# Patient Record
Sex: MALE | Race: WHITE | NOT HISPANIC OR LATINO | Employment: FULL TIME | URBAN - METROPOLITAN AREA
[De-identification: names, ages, dates, MRNs, and addresses within clinical notes are randomized per-mention and may not be internally consistent; named-entity substitution may affect disease eponyms.]

---

## 2023-10-24 ENCOUNTER — TELEMEDICINE (OUTPATIENT)
Dept: PRIMARY CARE | Facility: CLINIC | Age: 38
End: 2023-10-24
Payer: COMMERCIAL

## 2023-10-24 DIAGNOSIS — E06.1 THYROIDITIS, DE QUERVAIN: Primary | ICD-10-CM

## 2023-10-24 PROCEDURE — 99213 OFFICE O/P EST LOW 20 MIN: CPT | Performed by: STUDENT IN AN ORGANIZED HEALTH CARE EDUCATION/TRAINING PROGRAM

## 2023-10-24 RX ORDER — TRAZODONE HYDROCHLORIDE 100 MG/1
1 TABLET ORAL NIGHTLY
COMMUNITY
Start: 2023-07-16 | End: 2024-04-16 | Stop reason: SDUPTHER

## 2023-10-24 NOTE — PROGRESS NOTES
Subjective   Patient ID: Christian Louie is a 37 y.o. male who presents for Joint Swelling.    HPI   1 week history of swelling in his right first digit.  At the base of the metacarpal.  Originally was a lot more proximal however feels like it is migrating more distally.  Denies any direct trauma to the area.  Has been working on his father-in-law's roof.  Has been ripping off a ladder suhail.  A lot of repetitive motion and gripping.  Pain with certain movements.  Denies any chest pain or shortness of breath.  Afebrile course.  Feels like it swollen however physically does not look very swollen.    Review of Systems   Constitutional:  Negative for chills, fatigue and fever.   HENT:  Negative for congestion, ear pain, facial swelling, hearing loss, rhinorrhea and sore throat.    Respiratory:  Negative for cough, shortness of breath and wheezing.    Cardiovascular:  Negative for chest pain and palpitations.   Gastrointestinal:  Negative for abdominal pain, constipation, diarrhea, nausea and vomiting.   Genitourinary:  Negative for dysuria and frequency.   Musculoskeletal:  Negative for arthralgias and joint swelling.   Skin:  Negative for rash.   Neurological:  Negative for dizziness and syncope.       Objective   There were no vitals taken for this visit.    Physical Exam  Constitutional:       Appearance: Normal appearance.   Musculoskeletal:      Comments: Positive Finkelstein sign on the right   Neurological:      Mental Status: He is alert.   Psychiatric:         Mood and Affect: Mood normal.         Assessment/Plan   We will start the patient on oral steroids as well as topical Voltaren.  Advised patient to please be more mindful of repetitive motion regarding gripping and using the first digit.  A hard rigid thumb spica splint recommended for use when not active.

## 2023-10-25 RX ORDER — METHYLPREDNISOLONE 4 MG/1
TABLET ORAL
Qty: 21 TABLET | Refills: 0 | Status: SHIPPED | OUTPATIENT
Start: 2023-10-25 | End: 2023-11-01

## 2023-10-25 RX ORDER — DICLOFENAC SODIUM 10 MG/G
4 GEL TOPICAL 4 TIMES DAILY
Qty: 100 G | Refills: 1 | Status: SHIPPED | OUTPATIENT
Start: 2023-10-25

## 2023-10-25 ASSESSMENT — ENCOUNTER SYMPTOMS
FATIGUE: 0
FREQUENCY: 0
VOMITING: 0
RHINORRHEA: 0
JOINT SWELLING: 0
FEVER: 0
CHILLS: 0
NAUSEA: 0
PALPITATIONS: 0
DIZZINESS: 0
ARTHRALGIAS: 0
SHORTNESS OF BREATH: 0
SORE THROAT: 0
ABDOMINAL PAIN: 0
WHEEZING: 0
COUGH: 0
CONSTIPATION: 0
DIARRHEA: 0
FACIAL SWELLING: 0
DYSURIA: 0

## 2024-04-15 ENCOUNTER — OFFICE VISIT (OUTPATIENT)
Dept: PRIMARY CARE | Facility: CLINIC | Age: 39
End: 2024-04-15
Payer: COMMERCIAL

## 2024-04-15 ENCOUNTER — HOSPITAL ENCOUNTER (OUTPATIENT)
Dept: RADIOLOGY | Facility: CLINIC | Age: 39
Discharge: HOME | End: 2024-04-15
Payer: COMMERCIAL

## 2024-04-15 ENCOUNTER — LAB (OUTPATIENT)
Dept: LAB | Facility: LAB | Age: 39
End: 2024-04-15
Payer: COMMERCIAL

## 2024-04-15 VITALS
BODY MASS INDEX: 18.83 KG/M2 | SYSTOLIC BLOOD PRESSURE: 121 MMHG | HEART RATE: 87 BPM | OXYGEN SATURATION: 99 % | WEIGHT: 139 LBS | DIASTOLIC BLOOD PRESSURE: 80 MMHG | HEIGHT: 72 IN

## 2024-04-15 DIAGNOSIS — R20.2 NUMBNESS AND TINGLING OF RIGHT ARM: ICD-10-CM

## 2024-04-15 DIAGNOSIS — R20.2 NUMBNESS AND TINGLING OF RIGHT ARM: Primary | ICD-10-CM

## 2024-04-15 DIAGNOSIS — E06.1 THYROIDITIS, DE QUERVAIN: ICD-10-CM

## 2024-04-15 DIAGNOSIS — R20.0 NUMBNESS AND TINGLING OF RIGHT ARM: ICD-10-CM

## 2024-04-15 DIAGNOSIS — R20.0 NUMBNESS AND TINGLING OF RIGHT ARM: Primary | ICD-10-CM

## 2024-04-15 DIAGNOSIS — M54.2 NECK PAIN: ICD-10-CM

## 2024-04-15 LAB
ALBUMIN SERPL BCP-MCNC: 4.9 G/DL (ref 3.4–5)
ALP SERPL-CCNC: 50 U/L (ref 33–120)
ALT SERPL W P-5'-P-CCNC: 31 U/L (ref 10–52)
ANION GAP SERPL CALC-SCNC: 18 MMOL/L (ref 10–20)
AST SERPL W P-5'-P-CCNC: 45 U/L (ref 9–39)
BASOPHILS # BLD AUTO: 0.03 X10*3/UL (ref 0–0.1)
BASOPHILS NFR BLD AUTO: 0.4 %
BILIRUB SERPL-MCNC: 1.1 MG/DL (ref 0–1.2)
BUN SERPL-MCNC: 14 MG/DL (ref 6–23)
CALCIUM SERPL-MCNC: 10 MG/DL (ref 8.6–10.3)
CHLORIDE SERPL-SCNC: 96 MMOL/L (ref 98–107)
CHOLEST SERPL-MCNC: 199 MG/DL (ref 0–199)
CHOLESTEROL/HDL RATIO: 1.9
CO2 SERPL-SCNC: 28 MMOL/L (ref 21–32)
CREAT SERPL-MCNC: 0.91 MG/DL (ref 0.5–1.3)
EGFRCR SERPLBLD CKD-EPI 2021: >90 ML/MIN/1.73M*2
EOSINOPHIL # BLD AUTO: 0.01 X10*3/UL (ref 0–0.7)
EOSINOPHIL NFR BLD AUTO: 0.1 %
ERYTHROCYTE [DISTWIDTH] IN BLOOD BY AUTOMATED COUNT: 11.6 % (ref 11.5–14.5)
GLUCOSE SERPL-MCNC: 101 MG/DL (ref 74–99)
HCT VFR BLD AUTO: 42.4 % (ref 41–52)
HDLC SERPL-MCNC: 105.9 MG/DL
HGB BLD-MCNC: 14.7 G/DL (ref 13.5–17.5)
IMM GRANULOCYTES # BLD AUTO: 0.02 X10*3/UL (ref 0–0.7)
IMM GRANULOCYTES NFR BLD AUTO: 0.2 % (ref 0–0.9)
LDLC SERPL CALC-MCNC: 73 MG/DL
LYMPHOCYTES # BLD AUTO: 1.78 X10*3/UL (ref 1.2–4.8)
LYMPHOCYTES NFR BLD AUTO: 21.9 %
MCH RBC QN AUTO: 32.7 PG (ref 26–34)
MCHC RBC AUTO-ENTMCNC: 34.7 G/DL (ref 32–36)
MCV RBC AUTO: 94 FL (ref 80–100)
MONOCYTES # BLD AUTO: 0.62 X10*3/UL (ref 0.1–1)
MONOCYTES NFR BLD AUTO: 7.6 %
NEUTROPHILS # BLD AUTO: 5.68 X10*3/UL (ref 1.2–7.7)
NEUTROPHILS NFR BLD AUTO: 69.8 %
NON HDL CHOLESTEROL: 93 MG/DL (ref 0–149)
NRBC BLD-RTO: 0 /100 WBCS (ref 0–0)
PLATELET # BLD AUTO: 236 X10*3/UL (ref 150–450)
POTASSIUM SERPL-SCNC: 4.5 MMOL/L (ref 3.5–5.3)
PROT SERPL-MCNC: 7.7 G/DL (ref 6.4–8.2)
RBC # BLD AUTO: 4.5 X10*6/UL (ref 4.5–5.9)
SODIUM SERPL-SCNC: 137 MMOL/L (ref 136–145)
TRIGL SERPL-MCNC: 100 MG/DL (ref 0–149)
TSH SERPL-ACNC: 1.9 MIU/L (ref 0.44–3.98)
VLDL: 20 MG/DL (ref 0–40)
WBC # BLD AUTO: 8.1 X10*3/UL (ref 4.4–11.3)

## 2024-04-15 PROCEDURE — 80053 COMPREHEN METABOLIC PANEL: CPT

## 2024-04-15 PROCEDURE — 72040 X-RAY EXAM NECK SPINE 2-3 VW: CPT | Performed by: RADIOLOGY

## 2024-04-15 PROCEDURE — 72040 X-RAY EXAM NECK SPINE 2-3 VW: CPT

## 2024-04-15 PROCEDURE — 84443 ASSAY THYROID STIM HORMONE: CPT

## 2024-04-15 PROCEDURE — 99214 OFFICE O/P EST MOD 30 MIN: CPT | Performed by: INTERNAL MEDICINE

## 2024-04-15 PROCEDURE — 85025 COMPLETE CBC W/AUTO DIFF WBC: CPT

## 2024-04-15 PROCEDURE — 80061 LIPID PANEL: CPT

## 2024-04-15 PROCEDURE — 36415 COLL VENOUS BLD VENIPUNCTURE: CPT

## 2024-04-15 ASSESSMENT — ENCOUNTER SYMPTOMS
LOSS OF SENSATION IN FEET: 0
OCCASIONAL FEELINGS OF UNSTEADINESS: 0
DEPRESSION: 0

## 2024-04-15 NOTE — PROGRESS NOTES
Mr. Louie is seen office today with chief complaint of numbness and tingling in the right forearm starting from the elbow going up to the wrist.  This has happened several times.  He is a  is worried.  He has a history of tendinitis before but did not have any symptoms at this time.  He also complains of some neck pain on movements.  He denies any fever or chills.  Has no chest pain or palpitation.  Has no respiratory symptoms.  Has no weakness of any extremity.  Denies any cold intolerance any symptoms of thyroid dysfunction.  Review of other systems are negative.    On examination:  General examination: Normal  Eyes: There is no pallor or jaundice  Neck: There is no lymphadenopathy or JVD movements of the cervical spine are slightly painful in all directions  Lungs: Clear to auscultation  CVS: Heart sounds are regular there is no gallop murmur  Abdomen: Normal exam  CNS: Power is normal  Examination of the right forearm.  Touch and position sensations seem to be intact in upper extremity power is normal.    Assessment and plan  1.  Paresthesias involving the right forearm: I am going to order a nerve conduction study for further evaluation  2.  Neck pain: X-ray of the cervical spine is ordered for further evaluation  3.  History of thyroiditis: Repeat TSH is ordered  4.  Health maintenance: I will do some routine blood testing including CBC CMP and lipid panel.  His follow-up appointment will depend on the result of the test ordered today.

## 2024-04-16 DIAGNOSIS — F51.01 PRIMARY INSOMNIA: Primary | ICD-10-CM

## 2024-04-16 DIAGNOSIS — R79.89 ABNORMAL LFTS: Primary | ICD-10-CM

## 2024-04-16 RX ORDER — TRAZODONE HYDROCHLORIDE 100 MG/1
100 TABLET ORAL NIGHTLY
Qty: 90 TABLET | Refills: 1 | Status: SHIPPED | OUTPATIENT
Start: 2024-04-16

## 2024-04-16 NOTE — TELEPHONE ENCOUNTER
Patient was just here yesterday, and forgot to tell you that Dr. Dale prescribed his Trazodone 100 mg. He would like that refilled.

## 2024-04-16 NOTE — TELEPHONE ENCOUNTER
Forget my last note. The patient DID see a liver specialist Nava Martines cnp.   She referred him a liver biopsie, which was done on 12/28/2022. It is in his chart under imaging. H wants a new doctor to for it .

## 2024-05-06 ENCOUNTER — HOSPITAL ENCOUNTER (OUTPATIENT)
Dept: NEUROLOGY | Facility: HOSPITAL | Age: 39
Discharge: HOME | End: 2024-05-06
Payer: COMMERCIAL

## 2024-05-06 DIAGNOSIS — M54.2 NECK PAIN: ICD-10-CM

## 2024-05-06 DIAGNOSIS — R20.2 NUMBNESS AND TINGLING OF RIGHT ARM: ICD-10-CM

## 2024-05-06 DIAGNOSIS — R20.0 NUMBNESS AND TINGLING OF RIGHT ARM: ICD-10-CM

## 2024-05-06 PROCEDURE — 95913 NRV CNDJ TEST 13/> STUDIES: CPT

## 2024-05-06 NOTE — PROCEDURES
Electromyography Laboratory Report       Accreditation with Exemplary Status       Name SARIKA PETERSON MRN [id number] Ref Provider JAMIRELIZA MELCHOR Technologist Link   Gender Male Age 38 Years ED[facility] Physician Good [place] MD  Temp ºC 32    1985 Height 6 feet 0 inch Order No [id number] Study Date 2024 9:10 AM      Reason for Referral:   Cervical Radiculopathy,         Motor Nerve Conduction Study   Nerve/Sites Muscle Amplitude Latency Velocity F min     mV ms m/s ms     Right Left Ref. Right Left Ref. Right Left Ref. Right Left Ref.   Median - APB      Wrist APB 9.2 10.4 >=6.0 5.6 5.1 <=3.9    32.5 31.6 <=33.5      AC Fossa APB 9.0 9.2  10.4 10.1  51.1 51.0 >=50.0      Ulnar - ADM      Wrist ADM 7.0 6.1 >=7.0 3.5 3.9 <=3.1    33.1 33.2 <=32.8      B. Elbow ADM 6.9 5.9  7.8 7.8  49.7 49.0 >=50.0         A. Elbow ADM 6.4 5.8  10.4 10.4  38.1 39.3 >=50.0          Sensory Nerve Conduction Study   Nerve/Sites Rec. Site Amplitude Peak Lat Velocity     µV ms m/s     Right Left Ref. Right Left Ref. Right Left Ref.   Median      Wrist Index 14.6 17.3 >=20.0 4.7 4.3 <=3.4 46.6 52.6 >=50.0   Ulnar      Wrist Little 18.0 21.2 >=12.0 4.3 4.2 <=3.1 43.4 49.6 >=50.0   Radial      Forearm Snuff Box 19.2 24.9 >=18.0 3.4 3.4 <=2.7 37.6 47.0 >=50.0   Median, Ulnar - Palmar Mixed      Median  Palm Med Wr 44.6 33.1  2.5 2.4 <=2.2 40.4 43.9 >=49.0      Ulnar Palm Uln Wr 31.6 30.2  2.3 2.3 <=2.2 48.0 53.0 >=49.0                      EMG Summary Table     Spontaneous Voluntary Activity   Muscle Nerve Roots Ins Act Fibs/PSW Fasc Other Amp Dur Phases Recruitment Activation Notes   R. APB - Abd Poll Brev Median C8-T1 NL 0 0 - NL +1 N/+1 SL NL -   R. PT - Pron Teres Median C6-C7 NL 0 0 - NL NL NL NL NL -   R. FDI - First Dors Int Ulnar C8-T1 NL 0 0 - NL +2 N/+1 SL NL -   R. FCU - Flex Carp Uln Ulnar C7-T1 NL 0 0 - NL NL NL NL NL -   R. TB - Triceps Brach Radial C6-C8 NL 0 0 - NL NL NL NL NL -   R. BB - Biceps Brach Musculocutaneous C5-C6  NL 0 0 - NL NL NL NL NL -   R. MD - Deltoid (med) Axillary C5-C6 NL 0 0 - NL NL NL NL NL -   R. Cervical psp (low)  - NL 0 0 - NL NL NL NL NL -   R. Cervical psp (mid)  - NL 0 0 - NL NL NL NL NL -   R. Cervical psp (up)  - NL 0 0 - NL NL NL NL NL -   L. APB - Abd Poll Brev Median C8-T1 NL 0 0 - NL +1 N/+1 SL NL -   L. PT - Pron Teres Median C6-C7 NL 0 0 - NL NL NL NL NL -   L. FDI - First Dors Int Ulnar C8-T1 NL 0 0 - NL +1 NL NL NL -   L. FCU - Flex Carp Uln Ulnar C7-T1 NL 0 0 - NL NL NL NL NL -   L. TB - Triceps Brach Radial C6-C8 NL 0 0 - NL NL NL NL NL -   L. BB - Biceps Brach Musculocutaneous C5-C6 NL 0 0 - NL NL NL NL NL -   L. MD - Deltoid (med) Axillary C5-C6 NL 0 0 - NL NL NL NL NL -   L. Cervical psp (low)  - NL 0 0 - NL NL NL NL NL -   L. Cervical psp (mid)  - NL 0 0 - NL NL NL NL NL -          NL = Normal, Inc = Increased, Dec = Decreased, CRD = Complex Repetitive Discharge; SL = Slightly Decreased; MO = Moderately Decreased; MK = Markedly Decreased; N/+1, +1, +2, +3 = Borderline, Slightly, Moderately, Markedly Increased; N/-1, -1, -2, -3 = Borderline, Slightly, Moderately, Markedly Decreased, N/A = Not Applicable    Summary:     Impression:  This study reveals [employer] evidence of chronic, neuropathic, axonal, sensorimotor processes affecting both median nerves at the wrists, consistent with the clinical diagnosis of bilateral carpal tunnel syndrome, and both ulnar nerves at the elbows (ulnar grooves / cubital tunnels). The R median nerve findings are of mild to moderate degree by electrical criteria, with the others being of mild degree. Note that bilateral CTS can result from diabetes mellitus or hypothyroidism.  The L ulnar nerve at the wrist (Guyon's canal) demonstrates is borderline.   There is no suggestion by this study of more proximal processes e.g. plexopathy or radiculopathy, nor of more widespread processes e.g. peripheral neuropathy or mononeuritis multiplex.

## 2024-05-07 DIAGNOSIS — G56.03 BILATERAL CARPAL TUNNEL SYNDROME: Primary | ICD-10-CM

## 2024-06-06 ENCOUNTER — OFFICE VISIT (OUTPATIENT)
Dept: PRIMARY CARE | Facility: CLINIC | Age: 39
End: 2024-06-06
Payer: COMMERCIAL

## 2024-06-06 VITALS
OXYGEN SATURATION: 98 % | SYSTOLIC BLOOD PRESSURE: 122 MMHG | WEIGHT: 133 LBS | BODY MASS INDEX: 18.01 KG/M2 | DIASTOLIC BLOOD PRESSURE: 78 MMHG | HEIGHT: 72 IN | HEART RATE: 72 BPM

## 2024-06-06 DIAGNOSIS — R73.9 HYPERGLYCEMIA: ICD-10-CM

## 2024-06-06 DIAGNOSIS — G56.03 BILATERAL CARPAL TUNNEL SYNDROME: ICD-10-CM

## 2024-06-06 DIAGNOSIS — R20.0 NUMBNESS AND TINGLING OF RIGHT ARM: Primary | ICD-10-CM

## 2024-06-06 DIAGNOSIS — M43.02 CERVICAL SPONDYLOLYSIS: ICD-10-CM

## 2024-06-06 DIAGNOSIS — R20.2 NUMBNESS AND TINGLING OF RIGHT ARM: Primary | ICD-10-CM

## 2024-06-06 DIAGNOSIS — R79.89 ABNORMAL LFTS: ICD-10-CM

## 2024-06-06 PROCEDURE — 99214 OFFICE O/P EST MOD 30 MIN: CPT | Performed by: INTERNAL MEDICINE

## 2024-06-06 ASSESSMENT — ENCOUNTER SYMPTOMS
DEPRESSION: 0
LOSS OF SENSATION IN FEET: 0
OCCASIONAL FEELINGS OF UNSTEADINESS: 0

## 2024-06-06 NOTE — PROGRESS NOTES
Internal Medicine Outpatient Visit  Chief Complaint   Patient presents with    Follow-up     Follow up from ortho appt.        HPI: Christian Louie is of 38 y.o. who is here for Internal Visit for the following issues:  Patient is seen office today for follow up of Recent nerve conduction study that showed that patient has bilateral carpal tunnel syndrome.  He had an orthopedic opinion however the orthopedic surgeon did not think that his symptoms is related to carpal tunnel syndrome and he suggested a detailed neurology consultation.  Patient is still has some numbness and tingling in the hands but he has more pain now.  He is appetite is not good.  He drinks about 24 cans of beer every week.  He denies any chest pain or palpitation.  Has no shortness of breath or wheezing.  Denies any nausea matting pain abdomen.  He has not noticed any blood in urine or stool.  He has no weakness of any extremity.  Denies any symptom of major depression.  Review of other systems are negative.    Past Surgical History:   Procedure Laterality Date    OTHER SURGICAL HISTORY  09/27/2022    Cyst excision    US GUIDED NEEDLE LIVER BIOPSY  12/28/2022    US GUIDED NEEDLE LIVER BIOPSY 12/28/2022 [facility]       Family History   Problem Relation Name Age of Onset    No Known Problems Mother           Current Outpatient Medications on File Prior to Visit   Medication Sig Dispense Refill    diclofenac sodium (Voltaren) 1 % gel gel Apply 1 Application topically 4 times a day. 100 g 1    traZODone (Desyrel) 100 mg tablet Take 1 tablet (100 mg) by mouth once daily at bedtime. 90 tablet 1     No current facility-administered medications on file prior to visit.       Blood pressure 122/78, pulse 72, height 1.829 m (6'), weight 60.3 kg (133 lb), SpO2 98%.  Body mass index is 18.04 kg/m².  General examination: Normal  Eyes: There is no pallor or jaundice external ocular movements are normal  Oral cavity throat normal  Neck: There is no  lymphadenopathy or JVD  Lungs: Clear to auscultation  CVS: Rhythm is regular there is no gallop murmur  Abdomen: There is normal belly tenderness  CNS: Power is normal  Examination of hands there is no resting of the small muscles of the hand.  Has some numbness in the fingers.  Legs: No edema  Skin: No rash      1. Numbness and tingling of right arm  Nerve conduction study shows bilateral carpal tunnel syndrome.  However the orthopedic surgeon does not think that the symptoms are related to carpal tunnel syndrome.  I will refer to neurology for opinion.  - Referral to Neurology; Future  - Comprehensive metabolic panel; Future    2. Bilateral carpal tunnel syndrome  Await neurology opinion  - Comprehensive metabolic panel; Future    3. Cervical spondylolysis  Await neurology opinion  - Referral to Neurology; Future  - Comprehensive metabolic panel; Future    4. Hyperglycemia  Repeat fasting blood sugar and hemoglobin A1c in 3 to 4 months  - Comprehensive metabolic panel; Future  - Hemoglobin A1c; Future    5. Abnormal LFTs  .  Liver function test and acute hepatitis panel in 3 to 4 months.  - Comprehensive metabolic panel; Future  - Hepatitis panel, acute; Future    Follow-up appointment in 3 to 4 months.

## 2024-09-06 ENCOUNTER — APPOINTMENT (OUTPATIENT)
Dept: PRIMARY CARE | Facility: CLINIC | Age: 39
End: 2024-09-06
Payer: COMMERCIAL

## 2025-04-10 ENCOUNTER — APPOINTMENT (OUTPATIENT)
Facility: CLINIC | Age: 40
End: 2025-04-10
Payer: COMMERCIAL

## 2025-06-26 ENCOUNTER — APPOINTMENT (OUTPATIENT)
Facility: CLINIC | Age: 40
End: 2025-06-26
Payer: COMMERCIAL